# Patient Record
(demographics unavailable — no encounter records)

---

## 2024-10-30 NOTE — ASSESSMENT
[FreeTextEntry1] : We discussed his course.  Medication use discussed.  Return to exercise discussed.  Premedication discussed.  He is WFD. Questions answered.  Patient seen by Dr. Nicholas Lofton, who determined the assessment and plan [place] participated in the care of the patient, including the history and physical exam. Helena SANDHU, am scribing for Dr. Nicholas Lofton in his presence for the chief complaint, physical exam, studies, assessment, and/or plan.

## 2024-10-30 NOTE — HISTORY OF PRESENT ILLNESS
[5] : 5 [2] : 2 [Dull/Aching] : dull/aching [] : yes [Intermittent] : intermittent [Massage] : massage [Full time] : Work status: full time [de-identified] : WC DOI 8/18/23: Patient is here for a follow up on his left shoulder. Pain has improved. [FreeTextEntry1] : Left shoulder [FreeTextEntry7] : to the bicep [de-identified] : certain pushing motions [de-identified] : massage 1 x a week, HEP

## 2024-10-30 NOTE — PHYSICAL EXAM
[Left] : left shoulder [Sitting] : sitting [Minimal] : minimal [5 ___] : forward flexion 5[unfilled]/5 [] : no sensory deficits [FreeTextEntry8] : There is trace discomfort to percussion at the AC joint.  [de-identified] : This is trace. [TWNoteComboBox4] : passive forward flexion 165 degrees [de-identified] : external rotation 55 degrees [TWNoteComboBox6] : internal rotation L2 [TWNoteComboBox5] : internal rotation at 90 degrees of abduction 40 degrees
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
## 2025-02-03 NOTE — PHYSICAL EXAM
[Chaperone Present] : A chaperone was present in the examining room during all aspects of the physical examination [FreeTextEntry2] : Becky

## 2025-02-03 NOTE — HISTORY OF PRESENT ILLNESS
[FreeTextEntry1] : Bertram returns after about a 5-year hiatus.  Long history of pelvic floor dysfunction, originally seen in 2004.  Patient's main complaint is that of penile and left testicular discomfort.  He remains working with the [facility] [employer] department.  Has been working with techniques of pelvic floor relaxation over the past many years and has worked with multiple physical therapists (including Josie Love) along with low-dose diazepam dosing forms which appear to have been quite helpful at alleviating symptoms.  The patient still has a very stressful lifestyle.  He mainly works a desk.  He remains very active with gym activities and has eliminated any activities which tend to aggravate symptoms.  No history of STD UTI gross hematuria.  Denies any irritative or obstructive voiding symptoms.  Pain level 6/10 on GUPI.  No current difficulties with constipation.  Patient's past medical history past surgical history and review of systems reviewed today and can be found in the patient's electronic medical record.  Urine studies from the patient's last visit 2020 showed no abnormalities.  CT abdomen pelvis 2021 showed left renal cysts and subcentimeter bilateral renal foci were too small to characterize.  Prostate was nonenlarged.  Patient at that time was presenting for left lower quadrant pain and no evidence of diverticulitis was identified.  Physical examination shows the patient to be alert oriented x 3 neuro grossly intact normal gait.  Abdominal exam showed mild distention scattered tympany no masses no organomegaly.  Some suprapubic tenderness to deep palpation however the patient needs to void at that time.  Genital examination shows the phallus to be circumcised no urethral discharge no cutaneous lesions present.  Both testes were approximately 18 to 20 cc soft nontender no masses normal right and left epididymis and cord structures were normal bilaterally.  Perineum was nontender nonindurated.  Digital rectal examination showed moderate heightening of anal sphincter tone and tone of distal levator plate.  Prostate was about 30 cc nontender nonindurated.  In summary Bertram once again presents with similar complaints as previous years.  In the past, techniques of pelvic floor relaxation, physical therapy and skeletal muscle relaxants seem to alleviate a great deal of the patient's symptoms.  The patient is under a great deal of stress at work we discussed how this may have a significant impact on continued symptoms on his difficulties with best symptom control.  My suggestion was for the patient to once again proceed with physical therapy and to be more adherent to basic components of pelvic floor relaxation protocol.  I also suggested that he might consider diazepam 5 to 10 mg suppository dosing nightly.  We discussed the pros and cons of this off label approach and the patient felt comfortable proceeding.  I have asked the patient to return in about 8 weeks for reevaluation.  Today we discussed possibly moving forward with medical therapy to address components of central up regulation i.e. gabapentin and dependent upon the patient's clinical progress, we may begin dosing.